# Patient Record
Sex: FEMALE | Race: WHITE
[De-identification: names, ages, dates, MRNs, and addresses within clinical notes are randomized per-mention and may not be internally consistent; named-entity substitution may affect disease eponyms.]

---

## 2018-12-11 ENCOUNTER — HOSPITAL ENCOUNTER (EMERGENCY)
Dept: HOSPITAL 25 - UCCORT | Age: 23
Discharge: HOME | End: 2018-12-11
Payer: COMMERCIAL

## 2018-12-11 VITALS — SYSTOLIC BLOOD PRESSURE: 117 MMHG | DIASTOLIC BLOOD PRESSURE: 73 MMHG

## 2018-12-11 DIAGNOSIS — F17.210: ICD-10-CM

## 2018-12-11 DIAGNOSIS — J02.8: Primary | ICD-10-CM

## 2018-12-11 PROCEDURE — 99212 OFFICE O/P EST SF 10 MIN: CPT

## 2018-12-11 PROCEDURE — G0463 HOSPITAL OUTPT CLINIC VISIT: HCPCS

## 2018-12-11 PROCEDURE — 87651 STREP A DNA AMP PROBE: CPT

## 2018-12-12 ENCOUNTER — HOSPITAL ENCOUNTER (EMERGENCY)
Dept: HOSPITAL 25 - UCCORT | Age: 23
Discharge: HOME | End: 2018-12-12
Payer: COMMERCIAL

## 2018-12-12 VITALS — DIASTOLIC BLOOD PRESSURE: 75 MMHG | SYSTOLIC BLOOD PRESSURE: 116 MMHG

## 2018-12-12 DIAGNOSIS — F17.210: ICD-10-CM

## 2018-12-12 DIAGNOSIS — J03.90: Primary | ICD-10-CM

## 2018-12-12 PROCEDURE — G0463 HOSPITAL OUTPT CLINIC VISIT: HCPCS

## 2018-12-12 PROCEDURE — 87070 CULTURE OTHR SPECIMN AEROBIC: CPT

## 2018-12-12 PROCEDURE — 87077 CULTURE AEROBIC IDENTIFY: CPT

## 2018-12-12 PROCEDURE — 99212 OFFICE O/P EST SF 10 MIN: CPT

## 2018-12-12 NOTE — UC
Throat Pain/Nasal Darryl HPI





- HPI Summary


HPI Summary: 





The patient is a 23-year-old female that presents here with a sore throat which 

has been worsening over the past 24 hours.  Rest friend was just diagnosed with 

strep.  He has felt feverish.  She has had nausea but no vomiting.  She has had 

headache and muscle aches.  She was seen here yesterday and had a negative 

rapid strep.








has had mono





- History of Current Complaint


Chief Complaint: UCGeneralIllness


Stated Complaint: RECHECK SORE THROAT


Time Seen by Provider: 12/12/18 17:10


Hx Obtained From: Patient


Hx Last Menstrual Period: 11/28/18


Onset/Duration: Gradual Onset, Lasting Hours


Severity: Moderate


Pain Intensity: 8


Pain Scale Used: 0-10 Numeric


Cough: None


Associated Signs & Symptoms: Positive: Fever





- Epiglottits Risk Factors


Epiglottis Risk Factors: Negative





- Allergies/Home Medications


Allergies/Adverse Reactions: 


 Allergies











Allergy/AdvReac Type Severity Reaction Status Date / Time


 


No Known Allergies Allergy   Verified 12/12/18 17:00














PMH/Surg Hx/FS Hx/Imm Hx


Previously Healthy: Yes





- Surgical History


Surgical History: Yes


Surgery Procedure, Year, and Place: Skin graft on ear x2





- Family History


Known Family History: 


   Negative: Diabetes





- Social History


Alcohol Use: Occasionally


Substance Use Type: None


Smoking Status (MU): Light Every Day Tobacco Smoker


Type: Cigarettes


Amount Used/How Often: social





Review of Systems


All Other Systems Reviewed And Are Negative: Yes


Constitutional: Positive: Fever, Chills


Skin: Positive: Negative


Eyes: Positive: Negative


ENT: Positive: Sore Throat


Respiratory: Positive: Negative


Cardiovascular: Positive: Negative


Gastrointestinal: Positive: Negative


Genitourinary: Positive: Negative


Motor: Positive: Negative


Neurovascular: Positive: Negative


Musculoskeletal: Positive: Myalgia


Neurological: Positive: Negative


Psychological: Positive: Negative





Physical Exam


Triage Information Reviewed: Yes


Appearance: Well-Appearing, No Pain Distress, Well-Nourished


Vital Signs: 


 Initial Vital Signs











Temp  97.5 F   12/12/18 16:57


 


Pulse  96   12/12/18 16:57


 


Resp  16   12/12/18 16:57


 


BP  116/75   12/12/18 16:57


 


Pulse Ox  100   12/12/18 16:57











Vital Signs Reviewed: Yes


Eyes: Positive: Conjunctiva Clear


ENT: Positive: Hearing grossly normal, Tonsillar swelling, Tonsillar exudate, 

Uvula midline.  Negative: Nasal congestion, Nasal drainage, Trismus, Muffled 

voice, Hoarse voice


Neck: Positive: Supple, Nontender, Enlarged Nodes @ - ant cervical


Respiratory: Positive: Lungs clear, Normal breath sounds, No respiratory 

distress, No accessory muscle use


Cardiovascular: Positive: RRR, No Murmur


Abdomen Description: Positive: Nontender, No Organomegaly.  Negative: Bruit, 

CVA Tenderness (R), CVA Tenderness (L), Hepatomegaly, Splenomegaly


Musculoskeletal: Positive: ROM Intact, No Edema


Neurological: Positive: Alert


Skin Exam: Normal





Throat Pain/Nasal Course/Dx





- Differential Dx/Diagnosis


Provider Diagnosis: 


 Exudative tonsillitis








Discharge





- Sign-Out/Discharge


Documenting (check all that apply): Patient Departure


All imaging exams completed and their final reports reviewed: No Studies





- Discharge Plan


Condition: Stable


Disposition: HOME


Prescriptions: 


Cephalexin CAP* [Keflex CAP*] 500 mg PO BID #20 cap


Patient Education Materials:  Tonsillitis (ED)


Forms:  *Work Release


Referrals: 


Rosa Maria Gibson MD [Primary Care Provider] - 


Additional Instructions: 


recheck in 3 days if not better





- Billing Disposition and Condition


Condition: STABLE


Disposition: Home

## 2018-12-16 NOTE — UC
- Progress Note


Progress Note: 





Notify patient she has Group C strep tonsillitis


needs to finish antibiotics





Course/Dx





- Diagnoses


Provider Diagnoses: 


 Exudative tonsillitis








Discharge





- Sign-Out/Discharge


Documenting (check all that apply): Post-Discharge Follow Up


All imaging exams completed and their final reports reviewed: No Studies





- Discharge Plan


Condition: Stable


Disposition: HOME


Prescriptions: 


Cephalexin CAP* [Keflex CAP*] 500 mg PO BID #20 cap


Patient Education Materials:  Tonsillitis (ED)


Forms:  *Work Release


Referrals: 


Rosa Maria Gibson MD [Primary Care Provider] - 


Additional Instructions: 


recheck in 3 days if not better





- Billing Disposition and Condition


Condition: STABLE


Disposition: Home

## 2020-05-19 NOTE — UC
Throat Pain/Nasal Darryl HPI





- HPI Summary


HPI Summary: 





sore throat x 1 day


no fever, + chills, + swollen neck glands


no cough , no nasal congestion 








- History of Current Complaint


Chief Complaint: UCGeneralIllness


Stated Complaint: FEVER, SORE THROAT


Time Seen by Provider: 12/11/18 08:57


Hx Obtained From: Patient


Hx Last Menstrual Period: 11/28/18


Pregnant?: No


Onset/Duration: Gradual Onset, Lasting Days - 1, Still Present


Severity: Moderate


Pain Intensity: 2


Cough: None


Associated Signs & Symptoms: Negative: Dysphagia, FB Sensation, Drooling, 

Wheezing, Hoarseness, Sinus Discomfort, Nasal Discharge, Fever, Vomiting, Rash





- Allergies/Home Medications


Allergies/Adverse Reactions: 


 Allergies











Allergy/AdvReac Type Severity Reaction Status Date / Time


 


No Known Allergies Allergy   Verified 12/11/18 08:48











Home Medications: 


 Home Medications





Acetaminophen [Tylenol Extra Strength] 500 mg PO ONCE 12/11/18 [History 

Confirmed 12/11/18]


Escitalopram Oxalate [Lexapro] 20 mg PO DAILY 12/11/18 [History Confirmed 12/11/ 18]











PMH/Surg Hx/FS Hx/Imm Hx


Previously Healthy: Yes





- Surgical History


Surgical History: Yes


Surgery Procedure, Year, and Place: Skin graft on ear x2





- Family History


Known Family History: 


   Negative: Diabetes





- Social History


Alcohol Use: Occasionally


Substance Use Type: None


Smoking Status (MU): Light Every Day Tobacco Smoker


Type: Cigarettes


Amount Used/How Often: social





Review of Systems


All Other Systems Reviewed And Are Negative: Yes


Constitutional: Positive: Negative


Skin: Positive: Negative


Eyes: Positive: Negative


ENT: Positive: Sore Throat


Respiratory: Positive: Negative


Is Patient Immunocompromised?: No





Physical Exam


Triage Information Reviewed: Yes


Appearance: Well-Appearing, No Pain Distress, Well-Nourished


Vital Signs: 


 Initial Vital Signs











Temp  98.2 F   12/11/18 08:48


 


Pulse  69   12/11/18 08:48


 


Resp  15   12/11/18 08:48


 


BP  117/73   12/11/18 08:48


 


Pulse Ox  99   12/11/18 08:48











Vital Signs Reviewed: Yes


Eye Exam: Normal


Eyes: Positive: Conjunctiva Clear


ENT: Positive: Normal ENT inspection, Hearing grossly normal, Pharyngeal 

erythema, TMs normal.  Negative: TM bulging, TM dull, TM red


Neck: Positive: Supple, Tenderness @, Enlarged Nodes @


Respiratory: Positive: Chest non-tender, Lungs clear, Normal breath sounds


Cardiovascular: Positive: RRR, No Murmur, Pulses Normal


Skin Exam: Normal





Throat Pain/Nasal Course/Dx





- Differential Dx/Diagnosis


Provider Diagnosis: 


 Viral pharyngitis








Discharge





- Sign-Out/Discharge


Documenting (check all that apply): Patient Departure


All imaging exams completed and their final reports reviewed: No Studies





- Discharge Plan


Condition: Stable


Disposition: HOME


Patient Education Materials:  Pharyngitis (ED)


Referrals: 


Rosa Maria Gibson MD [Primary Care Provider] - If Needed


Additional Instructions: 


viral sore throat,


negative rapid strep


no need for antibiotics





- Billing Disposition and Condition


Condition: STABLE


Disposition: Home 1. Will continue to monitor PO intake, weight, labs, skin, GI status, diet. 2. Encourage PO intake and obtain food preferences (pt did not have any at this time). 3. Reinforce nutrition therapy education as needed/requested -pt made aware RD remains available.